# Patient Record
Sex: MALE | Race: WHITE | Employment: UNEMPLOYED | URBAN - METROPOLITAN AREA
[De-identification: names, ages, dates, MRNs, and addresses within clinical notes are randomized per-mention and may not be internally consistent; named-entity substitution may affect disease eponyms.]

---

## 2020-01-09 ENCOUNTER — HOSPITAL ENCOUNTER (EMERGENCY)
Age: 60
Discharge: HOME | End: 2020-01-09
Attending: EMERGENCY MEDICINE
Payer: MEDICARE

## 2020-01-09 ENCOUNTER — APPOINTMENT (OUTPATIENT)
Dept: GENERAL RADIOLOGY | Age: 60
End: 2020-01-09
Attending: EMERGENCY MEDICINE
Payer: MEDICARE

## 2020-01-09 VITALS
OXYGEN SATURATION: 100 % | HEART RATE: 69 BPM | SYSTOLIC BLOOD PRESSURE: 130 MMHG | DIASTOLIC BLOOD PRESSURE: 94 MMHG | HEIGHT: 68 IN | BODY MASS INDEX: 33.34 KG/M2 | TEMPERATURE: 97.7 F | WEIGHT: 220 LBS | RESPIRATION RATE: 14 BRPM

## 2020-01-09 DIAGNOSIS — M79.671 BILATERAL FOOT PAIN: ICD-10-CM

## 2020-01-09 DIAGNOSIS — M19.071 ARTHRITIS OF BOTH FEET: Primary | ICD-10-CM

## 2020-01-09 DIAGNOSIS — M19.072 ARTHRITIS OF BOTH FEET: Primary | ICD-10-CM

## 2020-01-09 DIAGNOSIS — M79.672 BILATERAL FOOT PAIN: ICD-10-CM

## 2020-01-09 PROCEDURE — 74011250636 HC RX REV CODE- 250/636: Performed by: EMERGENCY MEDICINE

## 2020-01-09 PROCEDURE — 99282 EMERGENCY DEPT VISIT SF MDM: CPT

## 2020-01-09 PROCEDURE — 73620 X-RAY EXAM OF FOOT: CPT

## 2020-01-09 PROCEDURE — 96372 THER/PROPH/DIAG INJ SC/IM: CPT

## 2020-01-09 RX ORDER — DICLOFENAC SODIUM 10 MG/G
4 GEL TOPICAL 4 TIMES DAILY
Qty: 100 G | Refills: 0 | Status: SHIPPED | OUTPATIENT
Start: 2020-01-09

## 2020-01-09 RX ORDER — NAPROXEN 500 MG/1
500 TABLET ORAL 2 TIMES DAILY WITH MEALS
Qty: 28 TAB | Refills: 0 | Status: SHIPPED | OUTPATIENT
Start: 2020-01-09 | End: 2020-01-23

## 2020-01-09 RX ORDER — KETOROLAC TROMETHAMINE 30 MG/ML
60 INJECTION, SOLUTION INTRAMUSCULAR; INTRAVENOUS
Status: COMPLETED | OUTPATIENT
Start: 2020-01-09 | End: 2020-01-09

## 2020-01-09 RX ADMIN — KETOROLAC TROMETHAMINE 60 MG: 30 INJECTION, SOLUTION INTRAMUSCULAR at 13:17

## 2020-01-09 NOTE — ED PROVIDER NOTES
EMERGENCY DEPARTMENT HISTORY AND PHYSICAL EXAM    Date: 1/9/2020  Patient Name: Kathy Oconnell    History of Presenting Illness     Chief Complaint   Patient presents with    Difficulty Walking         History Provided By: Patient      Kathy Oconnell is a 62 y.o. male who presents to the emergency department C/O bilateral foot pain and pain with walking. Patient states the symptoms have been going on for about a week. He states he has had problems with his feet and ankle on the right side for several years with infections and swelling to his ankle joint. He states the redness and swelling to his ankle have been present for over a year. He states 28 years ago he also broke both of his ankles from a large fall. He states the pain in his left foot is mostly located on the bottom of the foot and will sometimes radiate up through the ankle region. He denies any falls or trauma. States that the pain just started out of nowhere. .  Patient does admit to drinking fairly often. PCP: None    Current Outpatient Medications   Medication Sig Dispense Refill    naproxen (NAPROSYN) 500 mg tablet Take 1 Tab by mouth two (2) times daily (with meals) for 14 days. 28 Tab 0    diclofenac (VOLTAREN) 1 % gel Apply 4 g to affected area four (4) times daily. 100 g 0    hydrocodone-acetaminophen (NORCO) 5-325 mg per tablet Take 1 tablet by mouth every four (4) hours as needed for Pain. 12 tablet 0       Past History     Past Medical History:  Past Medical History:   Diagnosis Date    Back problem        Past Surgical History:  Past Surgical History:   Procedure Laterality Date    CARDIAC SURG PROCEDURE UNLIST      HX CORONARY ARTERY BYPASS GRAFT         Family History:  No family history on file. Social History:  Social History     Tobacco Use    Smoking status: Never Smoker   Substance Use Topics    Alcohol use:  Yes     Alcohol/week: 10.0 standard drinks     Types: 12 Cans of beer per week    Drug use: Not on file       Allergies:  No Known Allergies      Review of Systems   Review of Systems   Constitutional: Negative for fever. Respiratory: Negative for shortness of breath. Cardiovascular: Negative for chest pain. Gastrointestinal: Negative for abdominal pain. Musculoskeletal: Positive for arthralgias, gait problem, joint swelling and myalgias. Negative for neck pain and neck stiffness. Skin: Positive for rash. Negative for wound. Neurological: Negative for dizziness, weakness, numbness and headaches. Physical Exam     Vitals:    01/09/20 1254   BP: (!) 130/94   Pulse: 69   Resp: 14   Temp: 97.7 °F (36.5 °C)   SpO2: 100%   Weight: 99.8 kg (220 lb)   Height: 5' 8\" (1.727 m)     Physical Exam    Nursing notes and vital signs reviewed    Constitutional: Non toxic appearing, no acute distress, chronically ill-appearing   HEENT: Normocephalic, Atraumatic, Pupils are equal, round, and reactive to light, EOMI  Cardiovascular: Regular rate and rhythm, no murmurs  Chest: Normal work of breathing and chest excursion bilaterally  Lungs: Clear to ausculation bilaterally  Abdomen: Soft, non tender, non distended, normoactive bowel sounds  Back: No evidence of trauma or deformity  Extremities: No evidence of trauma or deformity, no LE edema, there is chronic changes noted to the bilateral feet and ankles. There is a chronic scar formation over the lateral aspect of the right ankle with mild soft tissue swelling over the bilateral feet and ankle region. Skin: Warm and dry, normal cap refill, chronic skin changes, no evidence of cellulitis or abscess  Neuro: Alert and oriented x 3, CN intact, normal speech, strength and sensation full and symmetric bilaterally, normal coordination, no focal deficit, patient is able to ambulate but with pain  Psychiatric: Normal mood and affect      Diagnostic Study Results     Labs -   No results found for this or any previous visit (from the past 72 hour(s)).     Radiologic Studies -   XR FOOT LT AP/LAT   Final Result   IMPRESSION:      Evidence of prior, healed distal tibial and fibular fractures without evidence   of acute fracture or malalignment. XR FOOT RT AP/LAT   Final Result   IMPRESSION:         1. 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 2. Minor subluxation of the small toe at the level of the PIP joint. No   accompanying fracture. 3. Mild right first MTP joint osteoarthritis. CT Results  (Last 48 hours)    None        CXR Results  (Last 48 hours)    None          Medications given in the ED-  Medications   ketorolac tromethamine (TORADOL) 60 mg/2 mL injection 60 mg (60 mg IntraMUSCular Given 1/9/20 1317)         Medical Decision Making   I am the first provider for this patient. I reviewed the vital signs, available nursing notes, past medical history, past surgical history, family history and social history. Vital Signs-Reviewed the patient's vital signs. Records Reviewed: Nursing Notes    Procedures:  Procedures    ED Course:   X-ray shows significant arthritis wise unremarkable. Patient was given Toradol for pain. I discussed with the patient to continue with NSAIDs as directed and follow-up with a podiatrist for long-term management of his arthritis. He understands and agrees to plan    Diagnosis and Disposition     Critical Care:     DISCHARGE NOTE:    Ale Castillo  results have been reviewed with him. He has been counseled regarding his diagnosis, treatment, and plan. He verbally conveys understanding and agreement of the signs, symptoms, diagnosis, treatment and prognosis and additionally agrees to follow up as discussed. He also agrees with the care-plan and conveys that all of his questions have been answered.   I have also provided discharge instructions for him that include: educational information regarding their diagnosis and treatment, and list of reasons why they would want to return to the ED prior to their follow-up appointment, should his condition change. He has been provided with education for proper emergency department utilization. CLINICAL IMPRESSION:    1. Arthritis of both feet    2. Bilateral foot pain        PLAN:  1. D/C Home  2. Current Discharge Medication List      START taking these medications    Details   naproxen (NAPROSYN) 500 mg tablet Take 1 Tab by mouth two (2) times daily (with meals) for 14 days. Qty: 28 Tab, Refills: 0      diclofenac (VOLTAREN) 1 % gel Apply 4 g to affected area four (4) times daily. Qty: 100 g, Refills: 0           3. Follow-up Information     Follow up With Specialties Details Why Contact Info    German Bates MD Podiatry Schedule an appointment as soon as possible for a visit in 1 week for podiatrist (foot specailist) [address] [place] [address] 98.  [phone]          _______________________________      Please note that this dictation was completed with [employer], the computer voice recognition software. Quite often unanticipated grammatical, syntax, homophones, and other interpretive errors are inadvertently transcribed by the computer software. Please disregard these errors. Please excuse any errors that have escaped final proofreading.